# Patient Record
Sex: FEMALE | Race: WHITE
[De-identification: names, ages, dates, MRNs, and addresses within clinical notes are randomized per-mention and may not be internally consistent; named-entity substitution may affect disease eponyms.]

---

## 2018-09-13 ENCOUNTER — HOSPITAL ENCOUNTER (OUTPATIENT)
Dept: HOSPITAL 25 - OREAST | Age: 58
Discharge: HOME | End: 2018-09-13
Attending: PODIATRIST
Payer: COMMERCIAL

## 2018-09-13 VITALS — DIASTOLIC BLOOD PRESSURE: 78 MMHG | SYSTOLIC BLOOD PRESSURE: 131 MMHG

## 2018-09-13 PROCEDURE — 88311 DECALCIFY TISSUE: CPT

## 2018-09-13 PROCEDURE — 88304 TISSUE EXAM BY PATHOLOGIST: CPT

## 2018-09-14 NOTE — OP
DATE OF OPERATION:  09/13/18 - [facility]

 

DATE OF BIRTH:  [birth year]

 

SURGEON:  Bandar Ferguson DPM

 

ASSISTANT:  None.

 

ANESTHESIA:  Spinal.

 

PRE-OP DIAGNOSES:

1.  Chronic plantar fasciosis, left heel.

2.  Nerve entrapment with Juarez's neuritis, left heel.

 

POST-OP DIAGNOSES:

1.  Chronic plantar fasciosis, left heel.

2.  Nerve entrapment with Juarez's neuritis, left heel.

3.  Inferior calcaneal bone spur, left heel.

 

OPERATIVE PROCEDURE:

1.  Partial plantar fasciotomy, left heel.

2.  External neurolysis of the Juarez's nerve, left heel.

3.  Excision of inferior plantar calcaneal spur, left heel.

 

HEMOSTASIS:  High calf tourniquet on the left due to anatomy in the lower thigh 
making difficult for placement.

 

ESTIMATED BLOOD LOSS:  Less than 10 cc.

 

SPECIMEN:  Resected calcaneal spur.

 

INDICATIONS:  The patient with chronic left heel pain, not responding to 
numerous nonsurgical treatments and the patient cannot walk without pain and 
opted for surgery at this time to decrease her pain and improve her function.

 

DESCRIPTION OF PROCEDURE:  The patient was brought to the operating room, 
placed on the operating room table in the supine position.  The patient was put 
in a seated position and the anesthesia department administered spinal 
anesthesia.  The patient was then secured in a supine position.  The left foot 
was then prepped and draped in the usual fashion.  The left foot was 
exsanguinated with an Esmarch bandage and a high leg pneumatic tourniquet was 
inflated to 250 mmHg by a well-padded left leg. The attention was directed to 
the medial aspect of the left heel where curvilinear incision was made to have 
adequate exposure of the adductor hallucis muscle origin as well as the plantar 
fascia.  Dissection was carried through the subcutaneous tissues with care 
being taken to retract neurovascular structures and to cauterize superficial 
bleeders as needed.  Dissection was carried down to the origin of the abductor 
hallucis where sharp and blunt instrumentation was used to free the fascia and 
released the constrictures deep to the plantar fascia.  Portion of the lateral 
plantar nerve identified, freed from surrounding soft tissue attachments.

 

Next, the plantar fascia was transected from medial to lateral allowing for 
approximately one-third of the plantar fascia remaining intact, was noted to be 
very thick and scarred.  At this time, the inferior calcaneal spur was 
noticeable and prominent and it was determined to resect the tissue, where a 
previous MRI demonstrates inflammation in the structure as well.  A rongeur was 
used to resect the inferior calcaneal spur and sent off to field.  Handheld 
rasp was used to smooth rough edges.  With this area decompressed, the surgical 
site was flushed with copious amounts of normal sterile saline and the 
subcutaneous tissues were reapproximated and secured with 3-0 Vicryl and the 
skin was closed with 5-0 nylon. Next, a local block was performed with a 1:1 
mixture of 1% lidocaine plain and 0.5% ropivacaine plain.  12 mg of 
dexamethasone phosphate was infiltrated about the surgical site as well.  
Incision was dressed with Xeroform gauze and a sterile bulky compressive 
dressing was applied to the left heel and secured with Coban wrap.  The 
pneumatic tourniquet was deflated and a prompt hyperemic response was noted in 
all 5 digits of the patient's left foot.  Having appeared to tolerate the 
procedure and the anesthesia well, the patient was transported via cart from 
the operating room to Recovery in satisfactory condition with capillary refill 
less than 3 seconds to all 5 digits of the left foot.

 

 [id number] #: [id number]

[facility]